# Patient Record
Sex: MALE | Race: WHITE | NOT HISPANIC OR LATINO | Employment: FULL TIME | URBAN - METROPOLITAN AREA
[De-identification: names, ages, dates, MRNs, and addresses within clinical notes are randomized per-mention and may not be internally consistent; named-entity substitution may affect disease eponyms.]

---

## 2017-04-11 ENCOUNTER — OFFICE VISIT (OUTPATIENT)
Dept: URGENT CARE | Facility: CLINIC | Age: 32
End: 2017-04-11
Payer: COMMERCIAL

## 2017-04-11 VITALS
WEIGHT: 218 LBS | RESPIRATION RATE: 18 BRPM | BODY MASS INDEX: 30.52 KG/M2 | HEART RATE: 80 BPM | OXYGEN SATURATION: 96 % | HEIGHT: 71 IN | TEMPERATURE: 98.2 F | SYSTOLIC BLOOD PRESSURE: 120 MMHG | DIASTOLIC BLOOD PRESSURE: 78 MMHG

## 2017-04-11 DIAGNOSIS — J40 BRONCHITIS: ICD-10-CM

## 2017-04-11 PROCEDURE — 99214 OFFICE O/P EST MOD 30 MIN: CPT | Performed by: PHYSICIAN ASSISTANT

## 2017-04-11 RX ORDER — CODEINE PHOSPHATE AND GUAIFENESIN 10; 100 MG/5ML; MG/5ML
5 SOLUTION ORAL EVERY 4 HOURS PRN
Qty: 100 ML | Refills: 0 | Status: SHIPPED | OUTPATIENT
Start: 2017-04-11 | End: 2020-04-21

## 2017-04-11 RX ORDER — AZITHROMYCIN 250 MG/1
TABLET, FILM COATED ORAL
Qty: 6 TAB | Refills: 0 | Status: SHIPPED | OUTPATIENT
Start: 2017-04-11 | End: 2020-04-21

## 2017-04-11 ASSESSMENT — ENCOUNTER SYMPTOMS
FEVER: 0
PALPITATIONS: 0
SHORTNESS OF BREATH: 0
SORE THROAT: 1
COUGH: 1
WHEEZING: 0
SPUTUM PRODUCTION: 1
CHILLS: 0
HEMOPTYSIS: 0

## 2017-04-11 NOTE — PROGRESS NOTES
Subjective:      Talha Mo is a 31 y.o. male who presents with Cough            Cough  This is a new problem. The current episode started in the past 7 days. The problem has been gradually worsening. The cough is productive of sputum. Associated symptoms include nasal congestion and a sore throat. Pertinent negatives include no chest pain, chills, ear pain, fever, hemoptysis, shortness of breath or wheezing. Nothing aggravates the symptoms. He has tried OTC cough suppressant for the symptoms. The treatment provided no relief. His past medical history is significant for asthma.       Review of Systems   Constitutional: Positive for malaise/fatigue. Negative for fever and chills.   HENT: Positive for congestion and sore throat. Negative for ear pain.    Respiratory: Positive for cough and sputum production. Negative for hemoptysis, shortness of breath and wheezing.    Cardiovascular: Negative for chest pain and palpitations.     All other systems reviewed and are negative.  PMH:  has a past medical history of ASTHMA and Allergy.  MEDS:   Current outpatient prescriptions:   •  azithromycin (ZITHROMAX) 250 MG Tab, Take 500 mg (two tablets) on day one and then take 250 mg (1 tablet) for 4 days., Disp: 6 Tab, Rfl: 0  •  guaifenesin-codeine (CHERATUSSIN AC) Solution oral solution, Take 5 mL by mouth every four hours as needed for Cough., Disp: 100 mL, Rfl: 0  •  Mometasone Furo-Formoterol Fum (DULERA INH), Inhale  by mouth., Disp: , Rfl:   •  Azelastine-Fluticasone (DYMISTA) 137-50 MCG/ACT SUSP, Spray  in nose., Disp: , Rfl:   •  albuterol (VENTOLIN OR PROVENTIL) 108 (90 BASE) MCG/ACT AERS, Inhale 2 Puffs by mouth every four hours as needed., Disp: 1 Inhaler, Rfl: 6  •  budesonide-formoterol (SYMBICORT) 160-4.5 MCG/ACT AERO, Inhale 2 Puffs by mouth 2 Times a Day., Disp: 1 Inhaler, Rfl: 6  ALLERGIES: No Known Allergies  SURGHX:   Past Surgical History   Procedure Laterality Date   • Dental extraction(s)    "    SOCHX:  reports that he has never smoked. He does not have any smokeless tobacco history on file. He reports that he drinks alcohol. He reports that he does not use illicit drugs.  FH: Family history was reviewed, no pertinent findings to report  Medications, Allergies, and current problem list reviewed today in Epic       Objective:     /78 mmHg  Pulse 80  Temp(Src) 36.8 °C (98.2 °F)  Resp 18  Ht 1.803 m (5' 11\")  Wt 98.884 kg (218 lb)  BMI 30.42 kg/m2  SpO2 96%     Physical Exam   Constitutional: He is oriented to person, place, and time. He appears well-developed and well-nourished.   HENT:   Head: Normocephalic and atraumatic.   Right Ear: Hearing, tympanic membrane, external ear and ear canal normal.   Left Ear: Hearing, tympanic membrane, external ear and ear canal normal.   Nose: Nose normal.   Mouth/Throat: Uvula is midline, oropharynx is clear and moist and mucous membranes are normal.   Neck: Normal range of motion. Neck supple.   Cardiovascular: Normal rate, regular rhythm and normal heart sounds.  Exam reveals no gallop and no friction rub.    No murmur heard.  Pulmonary/Chest: Effort normal and breath sounds normal. No accessory muscle usage. No apnea, no tachypnea and no bradypnea. No respiratory distress. He has no decreased breath sounds. He has no wheezes. He has no rhonchi. He has no rales. He exhibits no tenderness.   Neurological: He is alert and oriented to person, place, and time.   Skin: Skin is warm and dry.   Psychiatric: He has a normal mood and affect. His behavior is normal. Judgment and thought content normal.   Vitals reviewed.              Assessment/Plan:     1. Bronchitis    - azithromycin (ZITHROMAX) 250 MG Tab; Take 500 mg (two tablets) on day one and then take 250 mg (1 tablet) for 4 days.  Dispense: 6 Tab; Refill: 0  - guaifenesin-codeine (CHERATUSSIN AC) Solution oral solution; Take 5 mL by mouth every four hours as needed for Cough.  Dispense: 100 mL; Refill: " 0    Differential diagnosis, natural history, supportive care, and indications for immediate follow-up discussed at length.   Follow-up with primary care provider within 4-5 days, emergency room precautions discussed.  Patient and/or family appears understanding of information.

## 2017-04-11 NOTE — MR AVS SNAPSHOT
"        Talha Mo   2017 3:00 PM   Office Visit   MRN: [id number]    Department:  [facility]   Dept Phone:  [phone]    Description:  Male : 1985   Provider:  Oscar Moreno PA-C           Reason for Visit     Cough x friday started with cough, dry chest, nasal congestion and drainage, has some throat irritation       Allergies as of 2017     No Known Allergies      You were diagnosed with     Bronchitis   [[id number]]         Vital Signs     Blood Pressure Pulse Temperature Respirations Height Weight    120/78 mmHg 80 36.8 °C (98.2 °F) 18 1.803 m (5' 11\") 98.884 kg (218 lb)    Body Mass Index Oxygen Saturation Smoking Status             30.42 kg/m2 96% Never Smoker          Basic Information     Date Of Birth Sex Race Ethnicity Preferred Language    1985 Male White Non- English      Problem List              ICD-10-CM Priority Class Noted - Resolved    Psoriasis L40.9   5/10/2013 - Present      Health Maintenance        Date Due Completion Dates    IMM DTaP/Tdap/Td Vaccine (1 - Tdap) 2002 ---            Current Immunizations     No immunizations on file.      Below and/or attached are the medications your provider expects you to take. Review all of your home medications and newly ordered medications with your provider and/or pharmacist. Follow medication instructions as directed by your provider and/or pharmacist. Please keep your medication list with you and share with your provider. Update the information when medications are discontinued, doses are changed, or new medications (including over-the-counter products) are added; and carry medication information at all times in the event of emergency situations     Allergies:  No Known Allergies          Medications  Valid as of: 2017 -  3:24 PM    Generic Name Brand Name Tablet Size Instructions for use    Albuterol Sulfate (Aero Soln) albuterol 108 (90 BASE) MCG/ACT Inhale 2 Puffs by mouth every " four hours as needed.        Azelastine-Fluticasone (Suspension) Azelastine-Fluticasone 137-50 MCG/ACT Spray  in nose.        Azithromycin (Tab) ZITHROMAX 250 MG Take 500 mg (two tablets) on day one and then take 250 mg (1 tablet) for 4 days.        Budesonide-Formoterol Fumarate (Aerosol) SYMBICORT 160-4.5 MCG/ACT Inhale 2 Puffs by mouth 2 Times a Day.        Guaifenesin-Codeine (Solution) ROBITUSSIN -10 mg/5mL Take 5 mL by mouth every four hours as needed for Cough.        Mometasone Furo-Formoterol Fum   Inhale  by mouth.        .                 Medicines prescribed today were sent to:     [facility] [address] [place] [address] [address] [facility]    [address] [facility] [address]    Phone: [phone] Fax: [phone]    Open 24 Hours?: Yes      Medication refill instructions:       If your prescription bottle indicates you have medication refills left, it is not necessary to call your provider’s office. Please contact your pharmacy and they will refill your medication.    If your prescription bottle indicates you do not have any refills left, you may request refills at any time through one of the following ways: The online [employer] system (except Urgent Care), by calling your provider’s office, or by asking your pharmacy to contact your provider’s office with a refill request. Medication refills are processed only during regular business hours and may not be available until the next business day. Your provider may request additional information or to have a follow-up visit with you prior to refilling your medication.   *Please Note: Medication refills are assigned a new Rx number when refilled electronically. Your pharmacy may indicate that no refills were authorized even though a new prescription for the same medication is available at the pharmacy. Please request the medicine by name with the pharmacy before contacting your provider for a refill.           [employer]  Access Code: Activation code not generated  Current MyChart Status: Active

## 2017-11-27 ENCOUNTER — OFFICE VISIT (OUTPATIENT)
Dept: URGENT CARE | Facility: PHYSICIAN GROUP | Age: 32
End: 2017-11-27
Payer: COMMERCIAL

## 2017-11-27 VITALS
HEART RATE: 60 BPM | DIASTOLIC BLOOD PRESSURE: 72 MMHG | SYSTOLIC BLOOD PRESSURE: 114 MMHG | TEMPERATURE: 97.5 F | WEIGHT: 212 LBS | BODY MASS INDEX: 29.68 KG/M2 | HEIGHT: 71 IN | OXYGEN SATURATION: 96 % | RESPIRATION RATE: 12 BRPM

## 2017-11-27 DIAGNOSIS — J03.90 ACUTE TONSILLITIS, UNSPECIFIED ETIOLOGY: ICD-10-CM

## 2017-11-27 PROCEDURE — 99214 OFFICE O/P EST MOD 30 MIN: CPT | Performed by: FAMILY MEDICINE

## 2017-11-27 RX ORDER — AMOXICILLIN 500 MG/1
500 CAPSULE ORAL 3 TIMES DAILY
Qty: 30 CAP | Refills: 0 | Status: SHIPPED | OUTPATIENT
Start: 2017-11-27 | End: 2020-04-21

## 2017-11-27 ASSESSMENT — ENCOUNTER SYMPTOMS
STRIDOR: 0
TROUBLE SWALLOWING: 0
SWOLLEN GLANDS: 1

## 2017-11-27 NOTE — PROGRESS NOTES
"Subjective:   Talha Robertson a 32 y.o. male who presents for Pharyngitis (R Side of throat/tonsil x 3 days; no sinus issues, cough, or fever)        Pharyngitis    This is a new problem. The current episode started in the past 7 days. The problem has been gradually worsening. The pain is worse on the right side. There has been no fever. The pain is moderate. Associated symptoms include swollen glands. Pertinent negatives include no stridor or trouble swallowing.     Review of Systems   HENT: Negative for trouble swallowing.    Respiratory: Negative for stridor.      No Known Allergies   Objective:   /72   Pulse 60   Temp 36.4 °C (97.5 °F)   Resp 12   Ht 1.803 m (5' 11\")   Wt 96.2 kg (212 lb)   SpO2 96%   BMI 29.57 kg/m²   Physical Exam   Constitutional: He is oriented to person, place, and time. He appears well-developed and well-nourished. No distress.   HENT:   Head: Normocephalic and atraumatic.   Mouth/Throat: Uvula is midline. Oropharyngeal exudate, posterior oropharyngeal edema and posterior oropharyngeal erythema present. No tonsillar abscesses.   Eyes: Conjunctivae and EOM are normal. Pupils are equal, round, and reactive to light.   Cardiovascular: Normal rate, regular rhythm and intact distal pulses.    No murmur heard.  Pulmonary/Chest: Effort normal and breath sounds normal. No respiratory distress.   Abdominal: Soft. He exhibits no distension. There is no tenderness.   Neurological: He is alert and oriented to person, place, and time. He has normal reflexes. No sensory deficit.   Skin: Skin is warm and dry.   Psychiatric: He has a normal mood and affect. His behavior is normal.   Vitals reviewed.        Assessment/Plan:   Assessment    1. Acute tonsillitis, unspecified etiology  Differential diagnosis, natural history, supportive care, and indications for immediate follow-up discussed.   - amoxicillin (AMOXIL) 500 MG Cap; Take 1 Cap by mouth 3 times a day.  Dispense: 30 Cap; " Refill: 0

## 2018-01-11 ENCOUNTER — OFFICE VISIT (OUTPATIENT)
Dept: URGENT CARE | Facility: PHYSICIAN GROUP | Age: 33
End: 2018-01-11
Payer: COMMERCIAL

## 2018-01-11 VITALS
BODY MASS INDEX: 28 KG/M2 | RESPIRATION RATE: 12 BRPM | TEMPERATURE: 97.8 F | OXYGEN SATURATION: 95 % | SYSTOLIC BLOOD PRESSURE: 120 MMHG | HEART RATE: 77 BPM | WEIGHT: 200 LBS | DIASTOLIC BLOOD PRESSURE: 70 MMHG | HEIGHT: 71 IN

## 2018-01-11 DIAGNOSIS — J06.9 VIRAL URI: ICD-10-CM

## 2018-01-11 LAB
FLUAV+FLUBV AG SPEC QL IA: NEGATIVE
INT CON NEG: NEGATIVE
INT CON NEG: NEGATIVE
INT CON POS: POSITIVE
INT CON POS: POSITIVE
S PYO AG THROAT QL: NEGATIVE

## 2018-01-11 PROCEDURE — 99214 OFFICE O/P EST MOD 30 MIN: CPT | Performed by: FAMILY MEDICINE

## 2018-01-11 PROCEDURE — 87804 INFLUENZA ASSAY W/OPTIC: CPT | Performed by: FAMILY MEDICINE

## 2018-01-11 PROCEDURE — 87880 STREP A ASSAY W/OPTIC: CPT | Performed by: FAMILY MEDICINE

## 2018-01-11 RX ORDER — FLUTICASONE PROPIONATE 50 MCG
1 SPRAY, SUSPENSION (ML) NASAL 2 TIMES DAILY
Qty: 1 BOTTLE | Refills: 0 | Status: SHIPPED | OUTPATIENT
Start: 2018-01-11 | End: 2020-04-21

## 2018-01-11 NOTE — PROGRESS NOTES
"Subjective:     CC:  presents with Pharyngitis            Pharyngitis   This is a new problem. The current episode started in the past 3 days. The problem has been unchanged. There has been no fever. The pain is moderate. Associated symptoms include a hoarse voice, swollen glands . Pertinent negatives include no abdominal pain, congestion, coughing, diarrhea, headaches, shortness of breath or vomiting. no exposure to strep or mono.   has tried acetaminophen for the symptoms. The treatment provided mild relief.     Social History   Substance Use Topics   • Smoking status: Never Smoker   • Smokeless tobacco: Never Used   • Alcohol use Yes      Comment: Social       Past Medical History:   Diagnosis Date   • Allergy    • ASTHMA        Review of Systems   Constitutional: Positive for malaise/fatigue. Negative for fever and weight loss.   HENT: Positive for hoarse voice and trouble swallowing. Negative for congestion.    Respiratory: Negative for cough, sputum production and shortness of breath.    Cardiovascular: Negative for chest pain.   Gastrointestinal: Negative for nausea, vomiting, abdominal pain and diarrhea.   Genitourinary: Negative.    Neurological: Negative for dizziness and headaches.   All other systems reviewed and are negative.         Objective:   Blood pressure 120/70, pulse 77, temperature 36.6 °C (97.8 °F), resp. rate 12, height 1.803 m (5' 11\"), weight 90.7 kg (200 lb), SpO2 95 %.        Physical Exam   Constitutional:   oriented to person, place, and time.  appears well-developed and well-nourished. No distress.   HENT:   Head: Normocephalic and atraumatic.   Right Ear: External ear normal.   Left Ear: External ear normal.   Nose: Mucosal edema present. Right sinus exhibits no maxillary sinus tenderness and no frontal sinus tenderness. Left sinus exhibits no maxillary sinus tenderness and no frontal sinus tenderness.   Mouth/Throat: no posterior oropharyngeal exudate.   There is posterior " oropharyngeal erythema present. No posterior oropharyngeal edema.   Tonsils 2+ bilaterally     Eyes: Conjunctivae and EOM are normal. Pupils are equal, round, and reactive to light. Right eye exhibits no discharge. Left eye exhibits no discharge. No scleral icterus.   Neck: Normal range of motion. Neck supple. No JVD present. No tracheal deviation present. No thyromegaly present.   Cardiovascular: Normal rate, regular rhythm, normal heart sounds and intact distal pulses.  Exam reveals no friction rub.    No murmur heard.  Pulmonary/Chest: Effort normal and breath sounds normal. No respiratory distress.   no wheezes.   no rales.    Musculoskeletal:  exhibits no edema.   Lymphadenopathy:     Positive Cervical adenopathy.   Neurological:   alert and oriented to person, place, and time.   Skin: Skin is warm and dry. No erythema.   Psychiatric:   normal mood and affect.   Nursing note and vitals reviewed.             Assessment/Plan:     1. Viral URI  Rapid strep, influenza negative.   Follow up in one week if no improvement, sooner if symptoms worsen.     - fluticasone (FLONASE) 50 MCG/ACT nasal spray; Spray 1 Spray in nose 2 times a day.  Dispense: 1 Bottle; Refill: 0

## 2019-01-14 ENCOUNTER — OFFICE VISIT (OUTPATIENT)
Dept: URGENT CARE | Facility: PHYSICIAN GROUP | Age: 34
End: 2019-01-14
Payer: COMMERCIAL

## 2019-01-14 VITALS
HEIGHT: 71 IN | HEART RATE: 68 BPM | SYSTOLIC BLOOD PRESSURE: 122 MMHG | DIASTOLIC BLOOD PRESSURE: 64 MMHG | OXYGEN SATURATION: 98 % | BODY MASS INDEX: 29.12 KG/M2 | WEIGHT: 208 LBS | RESPIRATION RATE: 14 BRPM | TEMPERATURE: 97.3 F

## 2019-01-14 DIAGNOSIS — B34.1 INFECTION, COXSACKIE VIRUS: ICD-10-CM

## 2019-01-14 PROCEDURE — 99214 OFFICE O/P EST MOD 30 MIN: CPT | Performed by: PHYSICIAN ASSISTANT

## 2019-01-16 ASSESSMENT — ENCOUNTER SYMPTOMS
WHEEZING: 0
CHILLS: 0
FALLS: 0
RHINORRHEA: 0
DIARRHEA: 0
EYE DISCHARGE: 0
MYALGIAS: 1
ABDOMINAL PAIN: 0
EYE REDNESS: 0
FEVER: 0
SORE THROAT: 0
COUGH: 0
FATIGUE: 1

## 2019-01-16 NOTE — PROGRESS NOTES
"Subjective:      Talha Mo is a 33 y.o. male who presents with Rash (onset saturday, bilat hand and feet rash, \"little blisters\" sensitive, itchy)            Patient is a pleasant 33-year-old male who presents to urgent care with concern regarding rash to his hands and his feet for the last 3 days.  Patient reports the rash developed on the soles and palms of his feet and hands slight soreness to his fingertips otherwise the rash is minimally itchy.  Patient denies any associated fevers, oral manifestations or sores.  He does report past history of scaling patches to his body consistent with psoriatic lesions however he feels as though these are different.  He does report slight blisterlike rash to his distal lower extremities however this is scattered and rare.  He does report slight body aches and chills however denies any discrete fevers, cough or congestion.  He denies any ill contacts that he is aware of      Rash   This is a new problem. Episode onset: 3 days ago. The problem is unchanged. Location: As above. The rash is characterized by blistering, redness and pain. It is unknown if there was an exposure to a precipitant. Associated symptoms include fatigue. Pertinent negatives include no cough, diarrhea, fever, joint pain, rhinorrhea or sore throat. Treatments tried: Hydrocortisone cream. The treatment provided no relief.       Review of Systems   Constitutional: Positive for fatigue and malaise/fatigue. Negative for chills and fever.   HENT: Negative for rhinorrhea and sore throat.    Eyes: Negative for discharge and redness.   Respiratory: Negative for cough and wheezing.    Gastrointestinal: Negative for abdominal pain and diarrhea.   Musculoskeletal: Positive for myalgias. Negative for falls and joint pain.   Skin: Positive for itching and rash.   All other systems reviewed and are negative.         Objective:     /64   Pulse 68   Temp 36.3 °C (97.3 °F) (Temporal)   Resp 14   Ht " "1.803 m (5' 11\")   Wt 94.3 kg (208 lb)   SpO2 98%   BMI 29.01 kg/m²    PMH:  has a past medical history of Allergy and ASTHMA.  MEDS:   Current Outpatient Prescriptions:   •  Fluticasone Furoate-Vilanterol (BREO ELLIPTA INH), Inhale  by mouth., Disp: , Rfl:   •  albuterol (VENTOLIN OR PROVENTIL) 108 (90 BASE) MCG/ACT AERS, Inhale 2 Puffs by mouth every four hours as needed., Disp: 1 Inhaler, Rfl: 6  •  fluticasone (FLONASE) 50 MCG/ACT nasal spray, Spray 1 Spray in nose 2 times a day. (Patient not taking: Reported on 1/14/2019), Disp: 1 Bottle, Rfl: 0  •  amoxicillin (AMOXIL) 500 MG Cap, Take 1 Cap by mouth 3 times a day. (Patient not taking: Reported on 1/14/2019), Disp: 30 Cap, Rfl: 0  •  azithromycin (ZITHROMAX) 250 MG Tab, Take 500 mg (two tablets) on day one and then take 250 mg (1 tablet) for 4 days. (Patient not taking: Reported on 11/27/2017), Disp: 6 Tab, Rfl: 0  •  guaifenesin-codeine (CHERATUSSIN AC) Solution oral solution, Take 5 mL by mouth every four hours as needed for Cough. (Patient not taking: Reported on 1/14/2019), Disp: 100 mL, Rfl: 0  •  Mometasone Furo-Formoterol Fum (DULERA INH), Inhale  by mouth., Disp: , Rfl:   •  Azelastine-Fluticasone (DYMISTA) 137-50 MCG/ACT Suspension, Spray  in nose., Disp: , Rfl:   •  budesonide-formoterol (SYMBICORT) 160-4.5 MCG/ACT AERO, Inhale 2 Puffs by mouth 2 Times a Day. (Patient not taking: Reported on 1/14/2019), Disp: 1 Inhaler, Rfl: 6  ALLERGIES: No Known Allergies  SURGHX:   Past Surgical History:   Procedure Laterality Date   • DENTAL EXTRACTION(S)       SOCHX:  reports that he has never smoked. He has never used smokeless tobacco. He reports that he drinks alcohol. He reports that he does not use drugs.  FH: Family history was reviewed, no pertinent findings to report    Physical Exam   Constitutional: He is oriented to person, place, and time. He appears well-developed and well-nourished. No distress.   HENT:   Head: Normocephalic and atraumatic. "   Right Ear: External ear normal.   Left Ear: External ear normal.   Mouth/Throat: Oropharynx is clear and moist. No oropharyngeal exudate.   Without any oral lesions.   Eyes: Pupils are equal, round, and reactive to light. Conjunctivae and EOM are normal.   Neck: Normal range of motion. Neck supple. No tracheal deviation present.   Cardiovascular: Normal rate and regular rhythm.    No murmur heard.  Pulmonary/Chest: Effort normal and breath sounds normal. No respiratory distress.   Musculoskeletal: Normal range of motion. He exhibits no edema.   Lymphadenopathy:     He has no cervical adenopathy.   Neurological: He is alert and oriented to person, place, and time. Coordination normal.   Skin: Skin is warm. Rash noted.        Scattered erythematous slightly blisterlike rash to   Bilateral palms and dorsal aspect of his fingers.  Some of the lesions are macule circular in shape.  Similar rash to bilateral feet and soles of feet extending to distal lower extremity.   Psychiatric: He has a normal mood and affect. His behavior is normal. Judgment and thought content normal.   Vitals reviewed.              Assessment/Plan:     1. Infection, coxsackie virus    Rash is very consistent with coxsackie virus at this time however patient appears to not be having significant oral manifestations.  Discussed the self-limiting nature of the virus although contagious nature as well.  Family discussed complications of which would require further follow-up, other supportive therapies were discussed.  Patient given precautionary s/sx that mandate immediate follow up and evaluation in the ED. Advised of risks of not doing so.    DDX, Supportive care, and indications for immediate follow-up discussed with patient.    Instructed to return to clinic or nearest emergency department if we are not available for any change in condition, further concerns, or worsening of symptoms.    The patient demonstrated a good understanding and agreed  with the treatment plan.  Please note that this dictation was created using voice recognition software. I have made every reasonable attempt to correct obvious errors, but I expect that there are errors of grammar and possibly content that I did not discover before finalizing the note.

## 2019-02-26 ENCOUNTER — OFFICE VISIT (OUTPATIENT)
Dept: URGENT CARE | Facility: CLINIC | Age: 34
End: 2019-02-26
Payer: COMMERCIAL

## 2019-02-26 VITALS
TEMPERATURE: 97.8 F | BODY MASS INDEX: 28.42 KG/M2 | WEIGHT: 203 LBS | OXYGEN SATURATION: 97 % | HEART RATE: 84 BPM | SYSTOLIC BLOOD PRESSURE: 124 MMHG | RESPIRATION RATE: 18 BRPM | DIASTOLIC BLOOD PRESSURE: 80 MMHG | HEIGHT: 71 IN

## 2019-02-26 DIAGNOSIS — J02.9 PHARYNGITIS, UNSPECIFIED ETIOLOGY: ICD-10-CM

## 2019-02-26 DIAGNOSIS — M79.10 MYALGIA: ICD-10-CM

## 2019-02-26 LAB
INT CON NEG: NORMAL
INT CON POS: NORMAL
S PYO AG THROAT QL: NORMAL

## 2019-02-26 PROCEDURE — 99214 OFFICE O/P EST MOD 30 MIN: CPT | Performed by: NURSE PRACTITIONER

## 2019-02-26 PROCEDURE — 87880 STREP A ASSAY W/OPTIC: CPT | Performed by: NURSE PRACTITIONER

## 2019-02-26 RX ADMIN — Medication 25 MG: at 12:27

## 2019-02-26 NOTE — LETTER
February 26, 2019         Patient: Talha Mo   YOB: 1985   Date of Visit: 2/26/2019           To Whom it May Concern:    Talha Mo was seen in my clinic on 2/26/2019. He may return to work in 1-3 days as symptoms improve.  Please excuse recent absence due to acute illness.    If you have any questions or concerns, please don't hesitate to call.        Sincerely,           RUDY Pena.  Electronically Signed

## 2019-02-28 ASSESSMENT — ENCOUNTER SYMPTOMS
WHEEZING: 0
DIAPHORESIS: 0
SHORTNESS OF BREATH: 0
CHILLS: 0
SORE THROAT: 1
HEMOPTYSIS: 0
SINUS PAIN: 0
COUGH: 1
FEVER: 0
MYALGIAS: 1
SPUTUM PRODUCTION: 0
WEAKNESS: 0

## 2019-02-28 NOTE — PROGRESS NOTES
"Subjective:      Talha Mo is a 33 y.o. male who presents with Generalized Body Aches (Sore throat, Slight cough)            Patient comes in today with a 2 day history of myalgias and pharyngitis.  He has a slight dry cough, which he feels is secondary to the irritated throat.  No fever, chills, or nasal congestion.   Patient has tried cold medicine with no relief.  Denies any chest pain or shortness of breath.  Non-smoker.  No GERD or post nasal drainage.           Review of Systems   Constitutional: Positive for malaise/fatigue. Negative for chills, diaphoresis and fever.   HENT: Positive for sore throat. Negative for congestion, ear pain and sinus pain.    Respiratory: Positive for cough. Negative for hemoptysis, sputum production, shortness of breath and wheezing.    Musculoskeletal: Positive for myalgias.   Skin: Negative for rash.   Neurological: Negative for weakness.     Medications, Allergies, and current problem list reviewed today in Epic     Objective:     /80   Pulse 84   Temp 36.6 °C (97.8 °F) (Temporal)   Resp 18   Ht 1.803 m (5' 11\")   Wt 92.1 kg (203 lb)   SpO2 97%   BMI 28.31 kg/m²      Physical Exam   Constitutional: He is oriented to person, place, and time. He appears well-developed and well-nourished. No distress.   HENT:   Head: Normocephalic.   Right Ear: External ear normal.   Left Ear: External ear normal.   Nose: Nose normal.   Mouth/Throat: No oropharyngeal exudate.   Generalized oropharyngeal erythema with no exudate or edema.  Uvula midline.  No muffled voice.   Eyes: Pupils are equal, round, and reactive to light. Conjunctivae are normal. Right eye exhibits no discharge. Left eye exhibits no discharge. No scleral icterus.   Neck: Neck supple. No JVD present. No tracheal deviation present. No thyromegaly present.   Tender right anterior cervical adenopathy: mobile and rubbery.   Cardiovascular: Normal rate, regular rhythm and normal heart sounds.  Exam reveals " no gallop and no friction rub.    No murmur heard.  Pulmonary/Chest: Effort normal and breath sounds normal. No stridor. No respiratory distress. He has no wheezes. He has no rales. He exhibits no tenderness.   No cough in clinic.   Musculoskeletal: He exhibits no edema.   Lymphadenopathy:     He has cervical adenopathy.   Neurological: He is alert and oriented to person, place, and time.   Skin: Skin is warm and dry. He is not diaphoretic. No erythema. No pallor.   Vitals reviewed.    In clinic medication: 25 mg liquid benadryl po for throat pain.  Patient tolerated well.     POCT rapid strep a: negative     Assessment/Plan:     1. Pharyngitis, unspecified etiology  - POCT Rapid Strep A  - diphenhydrAMINE (BENADRYL) 12.5 MG/5ML liquid 25 mg; Take 10 mL by mouth Once.    2. Myalgia    Advised patient that based on the history and exam findings, this is likely a self-limiting viral illness.  There is no indication for antibiotics at this time.  OTC NSAIDs or tylenol prn fever, pain.  OTC throat sprays or lozenges prn symptom management.  Maintain adequate po hydration.  RTC in 5-7 days if symptoms persist, sooner if worse.  Patient verbalized understanding of and agreed with plan of care.

## 2020-04-21 ENCOUNTER — OFFICE VISIT (OUTPATIENT)
Dept: URGENT CARE | Facility: PHYSICIAN GROUP | Age: 35
End: 2020-04-21
Payer: COMMERCIAL

## 2020-04-21 VITALS
HEIGHT: 71 IN | SYSTOLIC BLOOD PRESSURE: 118 MMHG | WEIGHT: 206 LBS | HEART RATE: 65 BPM | RESPIRATION RATE: 12 BRPM | BODY MASS INDEX: 28.84 KG/M2 | TEMPERATURE: 99.3 F | OXYGEN SATURATION: 96 % | DIASTOLIC BLOOD PRESSURE: 70 MMHG

## 2020-04-21 DIAGNOSIS — M54.2 DORSALGIA OF CERVICAL REGION: ICD-10-CM

## 2020-04-21 PROCEDURE — 99214 OFFICE O/P EST MOD 30 MIN: CPT | Performed by: FAMILY MEDICINE

## 2020-04-21 RX ORDER — CYCLOBENZAPRINE HCL 10 MG
10 TABLET ORAL 3 TIMES DAILY PRN
Qty: 30 TAB | Refills: 0 | Status: SHIPPED | OUTPATIENT
Start: 2020-04-21

## 2020-04-21 RX ORDER — KETOROLAC TROMETHAMINE 30 MG/ML
30 INJECTION, SOLUTION INTRAMUSCULAR; INTRAVENOUS ONCE
Status: COMPLETED | OUTPATIENT
Start: 2020-04-21 | End: 2020-04-21

## 2020-04-21 RX ORDER — MELOXICAM 15 MG/1
15 TABLET ORAL DAILY
Qty: 7 TAB | Refills: 1 | Status: SHIPPED | OUTPATIENT
Start: 2020-04-21 | End: 2020-04-28

## 2020-04-21 RX ORDER — HYDROCODONE BITARTRATE AND ACETAMINOPHEN 5; 325 MG/1; MG/1
1 TABLET ORAL EVERY 6 HOURS PRN
Qty: 20 TAB | Refills: 0 | Status: SHIPPED | OUTPATIENT
Start: 2020-04-21 | End: 2020-04-28

## 2020-04-21 RX ORDER — METAXALONE 800 MG/1
800 TABLET ORAL 3 TIMES DAILY
COMMUNITY
End: 2020-04-21

## 2020-04-21 RX ADMIN — KETOROLAC TROMETHAMINE 30 MG: 30 INJECTION, SOLUTION INTRAMUSCULAR; INTRAVENOUS at 09:30

## 2020-04-21 SDOH — HEALTH STABILITY: MENTAL HEALTH: HOW OFTEN DO YOU HAVE A DRINK CONTAINING ALCOHOL?: 2-3 TIMES A WEEK

## 2020-04-21 ASSESSMENT — ENCOUNTER SYMPTOMS
BACK PAIN: 1
MYALGIAS: 1
NECK PAIN: 1

## 2020-04-21 ASSESSMENT — PAIN SCALES - GENERAL: PAINLEVEL: 8=MODERATE-SEVERE PAIN

## 2020-04-21 NOTE — PROGRESS NOTES
"Subjective:      Talha Mo is a 34 y.o. male who presents with Neck Pain (poss from using computer, R sided neck/back pain, muscle relaxer not helping. x4days )      - This is a pleasant and non toxic appearing 34 y.o. male with c/o non radiating Rt posterior neck pain x 4 days. No injury or NVFC. Worse movement, better rest    No fever, no trauma, no bowel/bladder dysfunction or lower limb weakness/heaviness.             ALLERGIES:  Patient has no known allergies.     PMH:  Past Medical History:   Diagnosis Date   • Allergy    • ASTHMA         PSH:  Past Surgical History:   Procedure Laterality Date   • DENTAL EXTRACTION(S)         MEDS:    Current Outpatient Medications:   •  Fluticasone Furoate-Vilanterol (BREO ELLIPTA INH), Inhale  by mouth., Disp: , Rfl:     ** I have documented what I find to be significant in regards to past medical, social, family and surgical history  in my HPI or under PMH/PSH/FH review section, otherwise it is contributory **           HPI    Review of Systems   Musculoskeletal: Positive for back pain, myalgias and neck pain.   All other systems reviewed and are negative.         Objective:     /70   Pulse 65   Temp 37.4 °C (99.3 °F) (Temporal)   Resp 12   Ht 1.803 m (5' 11\")   Wt 93.4 kg (206 lb)   SpO2 96%   BMI 28.73 kg/m²      Physical Exam  Vitals signs and nursing note reviewed.   Constitutional:       General: He is not in acute distress.     Appearance: He is well-developed. He is not diaphoretic.   HENT:      Head: Normocephalic and atraumatic.   Eyes:      Conjunctiva/sclera: Conjunctivae normal.   Neck:      Musculoskeletal: Normal range of motion and neck supple. Muscular tenderness present. No neck rigidity.   Cardiovascular:      Heart sounds: Normal heart sounds. No murmur.   Pulmonary:      Effort: Pulmonary effort is normal. No respiratory distress.      Breath sounds: Normal breath sounds.   Musculoskeletal:         General: Tenderness present. " No swelling, deformity or signs of injury.        Arms:    Skin:     General: Skin is warm and dry.      Findings: No rash.   Neurological:      Mental Status: He is alert.      Motor: No abnormal muscle tone.   Psychiatric:         Judgment: Judgment normal.                 Assessment/Plan:           1. Dorsalgia of cervical region         - rest  - E.R. precautions discussed     Dx & d/c instructions discussed w/ patient and/or family members.     Follow up with PCP (or UC if PCP is unavailable) in 2-3 days to make sure improving and no further additional treatment needed, ER if not improving or feeling/getting worse.    Any realistic and/or common medication side effects that may have been given today(i.e. Rash, GI upset/constipation, sedation, elevation of BP or blood sugars) reviewed.     Patient left in stable condition      reviewed if narcotics given

## 2023-03-20 ENCOUNTER — OFFICE VISIT (OUTPATIENT)
Dept: URGENT CARE | Facility: PHYSICIAN GROUP | Age: 38
End: 2023-03-20
Payer: COMMERCIAL

## 2023-03-20 VITALS
RESPIRATION RATE: 16 BRPM | WEIGHT: 205.91 LBS | DIASTOLIC BLOOD PRESSURE: 84 MMHG | HEIGHT: 70 IN | BODY MASS INDEX: 29.48 KG/M2 | OXYGEN SATURATION: 96 % | TEMPERATURE: 96.8 F | HEART RATE: 71 BPM | SYSTOLIC BLOOD PRESSURE: 126 MMHG

## 2023-03-20 DIAGNOSIS — J02.9 PHARYNGITIS, UNSPECIFIED ETIOLOGY: ICD-10-CM

## 2023-03-20 DIAGNOSIS — H10.32 ACUTE BACTERIAL CONJUNCTIVITIS OF LEFT EYE: ICD-10-CM

## 2023-03-20 LAB — S PYO DNA SPEC NAA+PROBE: NOT DETECTED

## 2023-03-20 PROCEDURE — 87651 STREP A DNA AMP PROBE: CPT | Performed by: NURSE PRACTITIONER

## 2023-03-20 PROCEDURE — 99213 OFFICE O/P EST LOW 20 MIN: CPT | Performed by: NURSE PRACTITIONER

## 2023-03-20 RX ORDER — PREDNISONE 10 MG/1
20 TABLET ORAL DAILY
Qty: 6 TABLET | Refills: 0 | Status: SHIPPED | OUTPATIENT
Start: 2023-03-20 | End: 2023-03-23

## 2023-03-20 RX ORDER — ALBUTEROL SULFATE 90 UG/1
AEROSOL, METERED RESPIRATORY (INHALATION)
COMMUNITY
Start: 2023-01-09

## 2023-03-20 RX ORDER — TOBRAMYCIN 3 MG/ML
1 SOLUTION/ DROPS OPHTHALMIC EVERY 4 HOURS
Qty: 3 ML | Refills: 0 | Status: SHIPPED | OUTPATIENT
Start: 2023-03-20 | End: 2023-03-27

## 2023-03-20 NOTE — LETTER
March 20, 2023       Patient: Talha Mo   YOB: 1985   Date of Visit: 3/20/2023         To Whom It May Concern:    In my medical opinion, I recommend that Talha Mo be excused from work tomorrow, 3/21/2023 due to conjunctivitis which is contagious for 24 hours.      If you have any questions or concerns, please don't hesitate to call [phone]          Sincerely,          [email or web address] [email or web address].  Electronically Signed

## 2023-03-23 ASSESSMENT — ENCOUNTER SYMPTOMS
FATIGUE: 1
RESPIRATORY NEGATIVE: 1
FEVER: 0
SORE THROAT: 1
CARDIOVASCULAR NEGATIVE: 1
EYE DISCHARGE: 1
MUSCULOSKELETAL NEGATIVE: 1
SWOLLEN GLANDS: 1
NEUROLOGICAL NEGATIVE: 1
EYE REDNESS: 1
GASTROINTESTINAL NEGATIVE: 1
CHILLS: 0

## 2023-03-23 NOTE — PROGRESS NOTES
"Subjective:   Talha Mo is a 37 y.o. male who presents for Conjunctivitis (Left eye x 2 days , crusty and red ness ) and Pharyngitis (X 6 days , tonsils feel swollen )      Conjunctivitis  This is a new problem. The current episode started in the past 7 days (3 days). The problem occurs constantly. The problem has been gradually worsening. Associated symptoms include congestion, fatigue, a sore throat and swollen glands. Pertinent negatives include no chills or fever. The symptoms are aggravated by drinking and eating. He has tried acetaminophen and NSAIDs for the symptoms. The treatment provided mild relief.   Pharyngitis   This is a new problem. Episode onset: 3 days. The problem has been gradually worsening. There has been no fever. The pain is at a severity of 5/10. The pain is moderate. Associated symptoms include congestion and swollen glands. He has tried acetaminophen, NSAIDs and gargles for the symptoms. The treatment provided mild relief.     Review of Systems   Constitutional:  Positive for fatigue and malaise/fatigue. Negative for chills and fever.   HENT:  Positive for congestion and sore throat.    Eyes:  Positive for discharge and redness.   Respiratory: Negative.     Cardiovascular: Negative.    Gastrointestinal: Negative.    Genitourinary: Negative.    Musculoskeletal: Negative.    Skin: Negative.    Neurological: Negative.      Medications, Allergies, and current problem list reviewed today in Epic.     Objective:     /84 (BP Location: Right arm, Patient Position: Sitting, BP Cuff Size: Adult)   Pulse 71   Temp 36 °C (96.8 °F) (Temporal)   Resp 16   Ht 1.778 m (5' 10\")   Wt 93.4 kg (205 lb 14.6 oz)   SpO2 96%     Physical Exam  Vitals reviewed.   Constitutional:       Appearance: Normal appearance.   HENT:      Head: Normocephalic and atraumatic.      Right Ear: Tympanic membrane, ear canal and external ear normal.      Left Ear: Tympanic membrane, ear canal and external ear " normal.      Nose: Congestion present.      Mouth/Throat:      Lips: Pink.      Mouth: Mucous membranes are moist.      Pharynx: Oropharynx is clear. Posterior oropharyngeal erythema present.      Tonsils: 1+ on the right. 2+ on the left.   Eyes:      General:         Left eye: Discharge present.     Extraocular Movements: Extraocular movements intact.      Conjunctiva/sclera:      Left eye: Left conjunctiva is injected.      Pupils: Pupils are equal, round, and reactive to light.   Cardiovascular:      Rate and Rhythm: Normal rate and regular rhythm.      Pulses: Normal pulses.      Heart sounds: Normal heart sounds.   Pulmonary:      Effort: Pulmonary effort is normal.      Breath sounds: Normal breath sounds.   Abdominal:      General: Abdomen is flat. Bowel sounds are normal.      Palpations: Abdomen is soft.   Musculoskeletal:         General: Normal range of motion.      Cervical back: Normal range of motion and neck supple. Tenderness present.   Lymphadenopathy:      Cervical: Cervical adenopathy present.   Skin:     General: Skin is warm and dry.      Capillary Refill: Capillary refill takes less than 2 seconds.   Neurological:      General: No focal deficit present.      Mental Status: He is alert and oriented to person, place, and time.   Psychiatric:         Mood and Affect: Mood normal.         Behavior: Behavior normal.     Lab Results/POC Test Results   Results for orders placed or performed in visit on 03/20/23   POCT GROUP A STREP, PCR   Result Value Ref Range    POC Group A Strep, PCR Not Detected Not Detected, Invalid             Assessment/Plan:     Diagnosis and associated orders:     1. Pharyngitis, unspecified etiology  POCT GROUP A STREP, PCR    predniSONE (DELTASONE) 10 MG Tab      2. Acute bacterial conjunctivitis of left eye  POCT GROUP A STREP, PCR    tobramycin (TOBREX) 0.3 % Solution ophthalmic solution         Comments/MDM:     The patient's rapid strep was NEGATIVE  I will give the  patient steroids following the results of the TOAST trial.  We discussed a throat culture but due to short duration of symptoms and the fact it is unlikely to  we will hold off for now.  If the patietn continues to have symptoms I advised to return.  The natural history of mono would suggest that the patient has not developed antibodies and so I will not do a mono spot at this time.  If the patient continues to have pharyngitis and constitutional symptoms I instructed them to return 2 weeks for Monospot.    Return precautions given for PTA and worsening symptoms.    Supportive care discussed with salt water gargles and throat lozenges including benzocaine lozenges  I considered other causes of pharyngitis including Group C, G strep, peritonsillar abscess, jair's angina, and retropharyngeal abscess but the patient's reported symptoms and my exam do not support these alternative diagnosis based on information I have available today.  This may change and I encouraged the patient to return to clinic if they are experiencing new symptoms or their symptoms fail to resolve with time as we cannot rule out all pathology from a single Urgent Care visit.     Warm compresses to affected eye(s) 3 times daily  Hand hygiene discussed  Wash all recently used linens and towels in hot water  Do not touch dropper to eye (s)           Differential diagnosis, natural history, supportive care, and indications for immediate follow-up discussed.    Advised the patient to follow-up with the primary care physician for recheck, reevaluation, and consideration of further management.    Please note that this dictation was created using voice recognition software. I have made a reasonable attempt to correct obvious errors, but I expect that there are errors of grammar and possibly content that I did not discover before finalizing the note.    This note was electronically signed by HOLLY Costa

## 2023-03-29 ENCOUNTER — OFFICE VISIT (OUTPATIENT)
Dept: URGENT CARE | Facility: PHYSICIAN GROUP | Age: 38
End: 2023-03-29
Payer: COMMERCIAL

## 2023-03-29 VITALS
WEIGHT: 205 LBS | HEIGHT: 70 IN | RESPIRATION RATE: 16 BRPM | TEMPERATURE: 96.4 F | SYSTOLIC BLOOD PRESSURE: 118 MMHG | OXYGEN SATURATION: 94 % | BODY MASS INDEX: 29.35 KG/M2 | DIASTOLIC BLOOD PRESSURE: 76 MMHG | HEART RATE: 82 BPM

## 2023-03-29 DIAGNOSIS — J02.9 SORE THROAT: ICD-10-CM

## 2023-03-29 LAB — S PYO DNA SPEC NAA+PROBE: NOT DETECTED

## 2023-03-29 PROCEDURE — 87651 STREP A DNA AMP PROBE: CPT | Performed by: REGISTERED NURSE

## 2023-03-29 PROCEDURE — 99213 OFFICE O/P EST LOW 20 MIN: CPT | Performed by: REGISTERED NURSE

## 2023-03-29 RX ORDER — LIDOCAINE HYDROCHLORIDE 20 MG/ML
15 SOLUTION OROPHARYNGEAL 4 TIMES DAILY PRN
Qty: 300 ML | Refills: 0 | Status: SHIPPED | OUTPATIENT
Start: 2023-03-29 | End: 2023-04-03

## 2023-03-29 ASSESSMENT — ENCOUNTER SYMPTOMS
SHORTNESS OF BREATH: 0
NAUSEA: 0
VOMITING: 0
PALPITATIONS: 0
SORE THROAT: 1
EYE PAIN: 0
MYALGIAS: 0
DIZZINESS: 0
DIARRHEA: 0
CHILLS: 0
EYE DISCHARGE: 0
FEVER: 0
SPUTUM PRODUCTION: 0
HEADACHES: 0
COUGH: 0

## 2023-03-29 NOTE — PROGRESS NOTES
Chief Complaint   Patient presents with    Pharyngitis     Swollen tonsils in throat  Onset 2 weeks  Recovering from cold 2 weeks ago  Finished steroids; negative strep last week    Ear Fullness     Onset 2 weeks     HPI:   Patient is a 37 y.o. male who is presenting for two weeks for continued sore throat. Rated 2/10. Aggravating factors include swallowing or drinking. No fevers, runny cough, cough, nausea, vomiting, diarrhea. Finished a 3 day course of prednisone that was prescribed on 3/20/23, at that time he also had a negative strep test. Currently using OTC ibuprofen and tylenol which provides temporary relief.     Patient Active Problem List    Diagnosis Date Noted    Psoriasis 05/10/2013     No Known Allergies     Current Outpatient Medications Ordered in Epic   Medication Sig Dispense Refill    lidocaine (XYLOCAINE) 2 % Solution Take 15 mL by mouth 4 times a day as needed for Throat/Mouth Pain for up to 5 days. 300 mL 0    albuterol 108 (90 Base) MCG/ACT Aero Soln inhalation aerosol INHALE 2 PUFFS EVERY 3-4 HOURS AS NEEDED FOR WHEEZING      sertraline (ZOLOFT) 50 MG Tab Take 50 mg by mouth every day.      cyclobenzaprine (FLEXERIL) 10 MG Tab Take 1 Tab by mouth 3 times a day as needed. (Patient not taking: Reported on 3/29/2023) 30 Tab 0    Fluticasone Furoate-Vilanterol (BREO ELLIPTA INH) Inhale  by mouth. (Patient not taking: Reported on 3/20/2023)       No current [facility]-ordered facility-administered medications on file.     Pertinent history: Negative strep on 3/20/23    Social History     Tobacco Use    Smoking status: Never    Smokeless tobacco: Never   Vaping Use    Vaping Use: Unknown   Substance Use Topics    Alcohol use: Yes     Comment: Social    Drug use: Yes     Types: Marijuana     Review of Systems   Constitutional:  Positive for malaise/fatigue. Negative for chills and fever.   HENT:  Positive for congestion and sore throat. Negative for ear pain.    Eyes:  Negative for pain and discharge.    Respiratory:  Negative for cough, sputum production and shortness of breath.    Cardiovascular:  Negative for chest pain, palpitations and leg swelling.   Gastrointestinal:  Negative for diarrhea, nausea and vomiting.   Musculoskeletal:  Negative for myalgias.   Skin:  Negative for rash.   Neurological:  Negative for dizziness and headaches.      Vitals:    03/29/23 1434   BP: 118/76   Pulse: 82   Resp: 16   Temp: (!) 35.8 °C (96.4 °F)   SpO2: 94%     Physical Exam  Vitals and nursing note reviewed.   Constitutional:       General: He is not in acute distress.     Appearance: Normal appearance. He is well-developed. He is not ill-appearing, toxic-appearing or diaphoretic.   HENT:      Head: Normocephalic and atraumatic.      Right Ear: Tympanic membrane normal.      Left Ear: Tympanic membrane normal.      Nose: Nose normal. No congestion or rhinorrhea.      Mouth/Throat:      Mouth: Mucous membranes are moist.      Pharynx: Oropharynx is clear. Uvula midline. No oropharyngeal exudate or posterior oropharyngeal erythema.      Tonsils: 1+ on the right. 1+ on the left.      Comments: Trace tonsillar exudate bilaterally  Eyes:      General:         Right eye: No discharge.         Left eye: No discharge.      Conjunctiva/sclera: Conjunctivae normal.   Cardiovascular:      Rate and Rhythm: Normal rate and regular rhythm.      Pulses: Normal pulses.      Heart sounds: Normal heart sounds. No murmur heard.  Pulmonary:      Effort: Pulmonary effort is normal. No respiratory distress.      Breath sounds: Normal breath sounds. No wheezing, rhonchi or rales.   Chest:      Chest wall: No tenderness.   Musculoskeletal:         General: No swelling or tenderness.      Cervical back: Normal range of motion and neck supple.      Right lower leg: No edema.      Left lower leg: No edema.   Lymphadenopathy:      Cervical: No cervical adenopathy.   Skin:     General: Skin is warm and dry.   Neurological:      General: No focal  deficit present.      Mental Status: He is alert and oriented to person, place, and time.   Psychiatric:         Mood and Affect: Mood normal.     Assessment/Plan:  1. Sore throat  POCT GROUP A STREP, PCR    lidocaine (XYLOCAINE) 2 % Solution      Cepheid strep testing negative.  Vital signs are stable.  Was evaluated 3/20 with a negative strep test and given short course of steroids.  Reports symptoms improved temporarily while on steroids but then came back.  Has tried various OTCs nothing seems to help permanently.  On exam there is 1+ tonsillar enlargement with some trace exudate.  No muffled voice or drooling or stridor.  Will provide lidocaine for comfort.  Continue with OTC analgesics and Zyrtec to help with postnasal drainage.  Monitor symptoms.  ER precautions    Return to clinic or go to ED if symptoms worsen or persist. Indications for ED discussed at length. Patient/Parent/Guardian voices understanding. Follow-up with your primary care provider in 3-5 days. Red flag symptoms discussed. All side effects of medication discussed including allergic response, GI upset, tendon injury, rash, sedation etc.    Please note that this dictation was created using voice recognition software. I have made every reasonable attempt to correct obvious errors, but I expect that there are errors of grammar and possibly content that I did not discover before finalizing the note.

## 2025-02-17 ENCOUNTER — OFFICE VISIT (OUTPATIENT)
Dept: URGENT CARE | Facility: PHYSICIAN GROUP | Age: 40
End: 2025-02-17

## 2025-02-17 VITALS
SYSTOLIC BLOOD PRESSURE: 118 MMHG | BODY MASS INDEX: 32.76 KG/M2 | DIASTOLIC BLOOD PRESSURE: 72 MMHG | HEIGHT: 71 IN | HEART RATE: 78 BPM | RESPIRATION RATE: 18 BRPM | OXYGEN SATURATION: 98 % | TEMPERATURE: 97.8 F | WEIGHT: 234 LBS

## 2025-02-17 DIAGNOSIS — K21.9 GASTROESOPHAGEAL REFLUX DISEASE, UNSPECIFIED WHETHER ESOPHAGITIS PRESENT: ICD-10-CM

## 2025-02-17 DIAGNOSIS — J45.20 MILD INTERMITTENT ASTHMA WITHOUT COMPLICATION: ICD-10-CM

## 2025-02-17 PROCEDURE — 99214 OFFICE O/P EST MOD 30 MIN: CPT | Performed by: STUDENT IN AN ORGANIZED HEALTH CARE EDUCATION/TRAINING PROGRAM

## 2025-02-17 PROCEDURE — 99999 PR NO CHARGE: CPT | Performed by: STUDENT IN AN ORGANIZED HEALTH CARE EDUCATION/TRAINING PROGRAM

## 2025-02-17 PROCEDURE — 94664 DEMO&/EVAL PT USE INHALER: CPT | Performed by: STUDENT IN AN ORGANIZED HEALTH CARE EDUCATION/TRAINING PROGRAM

## 2025-02-17 RX ORDER — OMEPRAZOLE 40 MG/1
40 CAPSULE, DELAYED RELEASE ORAL DAILY
Qty: 30 CAPSULE | Refills: 0 | Status: SHIPPED | OUTPATIENT
Start: 2025-02-17

## 2025-02-17 RX ORDER — FLUTICASONE PROPIONATE 50 MCG
2 SPRAY, SUSPENSION (ML) NASAL
Qty: 16 G | Refills: 1 | Status: SHIPPED | OUTPATIENT
Start: 2025-02-17

## 2025-02-17 RX ORDER — BUDESONIDE AND FORMOTEROL FUMARATE DIHYDRATE 160; 4.5 UG/1; UG/1
AEROSOL RESPIRATORY (INHALATION)
Qty: 1 EACH | Refills: 1 | Status: SHIPPED | OUTPATIENT
Start: 2025-02-17

## 2025-02-17 RX ORDER — INHALER, ASSIST DEVICES
1 SPACER (EA) MISCELLANEOUS ONCE
Qty: 1 EACH | Refills: 0 | Status: SHIPPED | OUTPATIENT
Start: 2025-02-17 | End: 2025-02-17

## 2025-02-17 RX ORDER — CETIRIZINE HYDROCHLORIDE 10 MG/1
10 TABLET ORAL DAILY
Qty: 30 TABLET | Refills: 1 | Status: SHIPPED | OUTPATIENT
Start: 2025-02-17

## 2025-02-17 RX ORDER — PREDNISONE 20 MG/1
40 TABLET ORAL DAILY
Qty: 10 TABLET | Refills: 0 | Status: SHIPPED | OUTPATIENT
Start: 2025-02-17 | End: 2025-02-22

## 2025-02-18 NOTE — PROGRESS NOTES
Subjective:   CHIEF COMPLAINT  Chief Complaint   Patient presents with    Shortness of Breath     Was sick In January having SOB since       [facility]    History of Present Illness  Talha Mo is a 39 y.o. male who presents for evaluation of asthma.    Patient reports he has been experiencing an intermittent cough since approximately December.  Despite two courses of Z-Keith, one self-administered and another prescribed by his physician along with prednisone, his condition did not fully resolve. He has been dealing with this since 01/2025. He reports persistent shortness of breath, which fluctuates in severity. He continues to engage in physical activities such as gym workouts and recently moved to a new residence. He has a history of allergy-induced asthma, which he believes was exacerbated by exposure to weeds and mushrooms growing under his bedroom. He also reports fatigue and dizziness, with no specific triggers identified. He has no history of blood clots. He reports mild chest pain, rated 1 on a scale of 1 to 10, but no abdominal pain. He reports minor muscle aches and admits to not being in optimal physical condition. He has been using various over-the-counter antihistamines, which have provided some relief. He has previously found prednisone to be effective. He has been using an albuterol inhaler, which provides minimal relief. He also reports wheezing. He has a history of heartburn and acid reflux, for which he takes famotidine and Tums. He occasionally uses anti-inflammatories such as Motrin, ibuprofen, Aleve, and Advil.    FAMILY HISTORY  His mother may have passed away from blood clots and was on blood thinning medication.    ALLERGIES  The patient has allergy-induced asthma.    MEDICATIONS  Current: albuterol inhaler, famotidine, Tums  Past: prednisone, Z-Keith        REVIEW OF SYSTEMS  General: no fever or chills  GI: no nausea or vomiting  See HPI for further details.    PAST MEDICAL HISTORY  Patient  "Active Problem List    Diagnosis Date Noted    Psoriasis 05/10/2013       SURGICAL HISTORY   has a past surgical history that includes dental extraction(s).    ALLERGIES  No Known Allergies    CURRENT MEDICATIONS  Home Medications       Reviewed by Urbano Myers'darcie (Medical Assistant) on 02/17/25 at 1551  Med List Status: <None>     Medication Last Dose Status   albuterol 108 (90 Base) MCG/ACT Aero Soln inhalation aerosol Not Taking Active   cyclobenzaprine (FLEXERIL) 10 MG Tab  Active   Fluticasone Furoate-Vilanterol (BREO ELLIPTA INH) Not Taking Active   sertraline (ZOLOFT) 50 MG Tab  Active                    SOCIAL HISTORY  Social History     Tobacco Use    Smoking status: Never    Smokeless tobacco: Never   Vaping Use    Vaping status: Never Used   Substance and Sexual Activity    Alcohol use: Yes     Comment: Social    Drug use: Not Currently     Types: Marijuana    Sexual activity: Never       FAMILY HISTORY  Family History   Problem Relation Age of Onset    Diabetes Mother     Hypertension Mother           Objective:   PHYSICAL EXAM  VITAL SIGNS: /72   Pulse 78   Temp 36.6 °C (97.8 °F)   Resp 18   Ht 1.803 m (5' 11\")   Wt 106 kg (234 lb)   SpO2 98%   BMI 32.64 kg/m²     Gen: no acute distress, normal voice  Skin: dry, intact, moist mucosal membranes  Eyes: No conjunctival injection bilaterally.  Neck: Normal range of motion. No meningeal signs.   Lungs: No increased work of breathing.  Lungs diffusely tight with scattered expiratory wheezes.  No rhonchi or crackles.  Symmetric expansion  CV: RRR w/o murmurs or clicks  Abdomen: soft, no distention.No tenderness, rigidity or involuntary guarding.    Psych: normal affect, normal judgement, alert, awake    Assessment/Plan:     1. Gastroesophageal reflux disease, unspecified whether esophagitis present  omeprazole (PRILOSEC) 40 MG delayed-release capsule      2. Mild intermittent asthma without complication  fluticasone (FLONASE) 50 MCG/ACT " nasal spray    cetirizine (ZYRTEC) 10 MG Tab    budesonide-formoterol (SYMBICORT) 160-4.5 MCG/ACT Aerosol    Spacer/Aero-Holding Chambers (AEROCHAMBER PLUS-FLOW SIGNAL) Misc    predniSONE (DELTASONE) 20 MG Tab      Persistent cough secondary to asthma which has been poorly controlled.  Also suspect there is a component of reflux contributing to the cough.  Patient was otherwise well-appearing, nontoxic without any respiratory distress.  Afebrile without any tachycardia or hypoxia.  -Ordered Symbicort; 1 puff twice daily then as needed for cough, wheezing or shortness of breath (ie maintenance + reliever regimen MART therapy).  Proper technique reviewed  -Ordered Flonase and Zyrtec  -Ordered prednisone 40 mg p.o. daily x 5 days.  Side effects were discussed.  Only begin use of steroids if no improvement with above measures within 24 to 48 hours.  Would like to avoid the use of steroids given he has been on 2 previous rounds in the last couple of months.  -Return to urgent care any new/worsening symptoms or further questions or concerns.  Patient understood everything discussed.  All questions were answered.            Please note that this dictation was created using voice recognition software. I have made a reasonable attempt to correct obvious errors, but I expect that there are errors of grammar and possibly content that I did not discover before finalizing the note.